# Patient Record
Sex: MALE | Race: OTHER
[De-identification: names, ages, dates, MRNs, and addresses within clinical notes are randomized per-mention and may not be internally consistent; named-entity substitution may affect disease eponyms.]

---

## 2019-10-03 ENCOUNTER — HOSPITAL ENCOUNTER (EMERGENCY)
Dept: HOSPITAL 72 - EMR | Age: 49
Discharge: HOME | End: 2019-10-03
Payer: SELF-PAY

## 2019-10-03 VITALS — DIASTOLIC BLOOD PRESSURE: 74 MMHG | SYSTOLIC BLOOD PRESSURE: 120 MMHG

## 2019-10-03 VITALS — WEIGHT: 183 LBS | HEIGHT: 65 IN | BODY MASS INDEX: 30.49 KG/M2

## 2019-10-03 VITALS — DIASTOLIC BLOOD PRESSURE: 81 MMHG | SYSTOLIC BLOOD PRESSURE: 123 MMHG

## 2019-10-03 VITALS — SYSTOLIC BLOOD PRESSURE: 143 MMHG | DIASTOLIC BLOOD PRESSURE: 89 MMHG

## 2019-10-03 DIAGNOSIS — N32.9: ICD-10-CM

## 2019-10-03 DIAGNOSIS — M54.9: Primary | ICD-10-CM

## 2019-10-03 LAB
ADD MANUAL DIFF: NO
ALBUMIN SERPL-MCNC: 3.7 G/DL (ref 3.4–5)
ALBUMIN/GLOB SERPL: 0.9 {RATIO} (ref 1–2.7)
ALP SERPL-CCNC: 76 U/L (ref 46–116)
ALT SERPL-CCNC: 22 U/L (ref 12–78)
ANION GAP SERPL CALC-SCNC: 8 MMOL/L (ref 5–15)
APPEARANCE UR: CLEAR
APTT BLD: 27 SEC (ref 23–33)
APTT PPP: (no result) S
AST SERPL-CCNC: 25 U/L (ref 15–37)
BASOPHILS NFR BLD AUTO: 0.9 % (ref 0–2)
BILIRUB SERPL-MCNC: 0.6 MG/DL (ref 0.2–1)
BUN SERPL-MCNC: 16 MG/DL (ref 7–18)
CALCIUM SERPL-MCNC: 8.7 MG/DL (ref 8.5–10.1)
CHLORIDE SERPL-SCNC: 105 MMOL/L (ref 98–107)
CO2 SERPL-SCNC: 26 MMOL/L (ref 21–32)
CREAT SERPL-MCNC: 1.2 MG/DL (ref 0.55–1.3)
EOSINOPHIL NFR BLD AUTO: 2.1 % (ref 0–3)
ERYTHROCYTE [DISTWIDTH] IN BLOOD BY AUTOMATED COUNT: 12.1 % (ref 11.6–14.8)
GLOBULIN SER-MCNC: 4.2 G/DL
GLUCOSE UR STRIP-MCNC: NEGATIVE MG/DL
HCT VFR BLD CALC: 45.7 % (ref 42–52)
HGB BLD-MCNC: 15.4 G/DL (ref 14.2–18)
INR PPP: 1 (ref 0.9–1.1)
KETONES UR QL STRIP: NEGATIVE
LEUKOCYTE ESTERASE UR QL STRIP: NEGATIVE
LYMPHOCYTES NFR BLD AUTO: 26 % (ref 20–45)
MCV RBC AUTO: 84 FL (ref 80–99)
MONOCYTES NFR BLD AUTO: 5.1 % (ref 1–10)
NEUTROPHILS NFR BLD AUTO: 65.9 % (ref 45–75)
NITRITE UR QL STRIP: NEGATIVE
PH UR STRIP: 5 [PH] (ref 4.5–8)
PLATELET # BLD: 256 K/UL (ref 150–450)
POTASSIUM SERPL-SCNC: 4.9 MMOL/L (ref 3.5–5.1)
PROT UR QL STRIP: NEGATIVE
RBC # BLD AUTO: 5.45 M/UL (ref 4.7–6.1)
SODIUM SERPL-SCNC: 139 MMOL/L (ref 136–145)
SP GR UR STRIP: 1.02 (ref 1–1.03)
UROBILINOGEN UR-MCNC: NORMAL MG/DL (ref 0–1)
WBC # BLD AUTO: 6.9 K/UL (ref 4.8–10.8)

## 2019-10-03 PROCEDURE — 85025 COMPLETE CBC W/AUTO DIFF WBC: CPT

## 2019-10-03 PROCEDURE — 85610 PROTHROMBIN TIME: CPT

## 2019-10-03 PROCEDURE — 96374 THER/PROPH/DIAG INJ IV PUSH: CPT

## 2019-10-03 PROCEDURE — 85730 THROMBOPLASTIN TIME PARTIAL: CPT

## 2019-10-03 PROCEDURE — 36415 COLL VENOUS BLD VENIPUNCTURE: CPT

## 2019-10-03 PROCEDURE — 74176 CT ABD & PELVIS W/O CONTRAST: CPT

## 2019-10-03 PROCEDURE — 80053 COMPREHEN METABOLIC PANEL: CPT

## 2019-10-03 PROCEDURE — 96375 TX/PRO/DX INJ NEW DRUG ADDON: CPT

## 2019-10-03 PROCEDURE — 99284 EMERGENCY DEPT VISIT MOD MDM: CPT

## 2019-10-03 PROCEDURE — 81003 URINALYSIS AUTO W/O SCOPE: CPT

## 2019-10-03 NOTE — NUR
Discharge Summary: 

Patient sent home after reviewing ct scan by er doctor, report no pain at the 
left flank region. 

he is able to talk with no slur or delay and remains coherent, he answers 
questions accurately, 

IV on the Left AC removed, patient is able to stand with steady gait and walk 
with no impairement, 

uber taken home and arranged by patient.

## 2019-10-03 NOTE — DIAGNOSTIC IMAGING REPORT
Indication: Abdominal pain

 

Technique: Continuous helical transaxial imaging of the abdomen and pelvis was

obtained from the lung bases to the pubic symphysis. No intravenous contrast was

administered. Coronal 2-D reformats were also obtained. Automatic Exposure Control

was utilized.

 

 

Total Dose length Product (DLP):  911 mGycm

 

CT Dose Index Volume (CTDIvol):   15 mGy

 

Comparison: none

 

Findings: There are technical artifacts present limiting evaluation. The lung bases

are clear. There is no hydronephrosis or renal stones identified. There is

questionable ill-definition of the left kidney with minimal perinephric stranding.

The finding, if real may be due to infection or pyelonephritis. The study is limited

in this regard given the nonadministration of IV contrast material. There is mild

thickening of the wall of the urinary bladder. Correlate for cystitis. Partial cecal

resection noted with the staple line. Appendix is absent. The ileocolic anastomosis

appears grossly unremarkable. Bowel gas pattern is nonspecific. There is a tiny supra

umbilical hernia containing fat. There is no free fluid. Gallbladder is unremarkable.

There is a small left inguinal hernia containing fat.

 

IMPRESSION:

 

Questionable, mild ill-definition of the left kidney. Correlate for pyelonephritis.

 

Mild thickening of the bladder wall suggestive of cystitis.

 

No hydronephrosis or evidence of obstructive nephropathy.

 

Limited evaluation due to technical artifacts. Exam also limited by the

nonadministration of IV contrast.

 

Status post partial colonic resection in the right lower quadrant. Ileocolic

anastomosis noted. Appendectomy.

 

 

 

 

The CT scanner at [facility] is accredited by the [employer] College of

[employer] and the scans are performed using dose optimization techniques as

appropriate to a performed exam including Automatic Exposure control.

## 2019-10-03 NOTE — EMERGENCY ROOM REPORT
History of Present Illness


General


Chief Complaint:  Back Pain-No Injury


Source:  Patient





Present Illness


HPI


Patient is a 48-year-old male presents after increased low back pain.  Reports 

of increased pain to the left side of his abdomen.  Radiating to his groin.  He 

denies recent trauma.  Reports having some prior history of kidney stones.  

Denies any fever.  Denies any vomiting.  Pain is worse with movement.  Sharp in 

nature.  Reports having some increased dysuria.  He denies any hematuria.  Pain 

is constant.Patient had not been losing weight.  Denies any history of drug use.


Allergies:  


Coded Allergies:  


     No Known Allergies (Unverified , 10/3/19)





Patient History


Past Medical History:  see triage record


Reviewed Nursing Documentation:  PMH: Agreed; PSxH: Agreed





Nursing Documentation-PMH


Past Medical History:  No History, Except For





Review of Systems


All Other Systems:  negative except mentioned in HPI





Physical Exam





Vital Signs








  Date Time  Temp Pulse Resp B/P (MAP) Pulse Ox O2 Delivery O2 Flow Rate FiO2


 


10/3/19 10:52 98.1 79 19 131/98 (109) 97 Room Air  








Sp02 EP Interpretation:  reviewed, normal


General Appearance:  normal inspection, well appearing, no apparent distress, 

alert, GCS 15


Head:  atraumatic


ENT:  normal ENT inspection, hearing grossly normal, normal voice


Neck:  normal inspection, full range of motion, supple, no bony tend


Respiratory:  normal inspection, lungs clear, normal breath sounds, no 

respiratory distress, no retraction, no wheezing


Cardiovascular #1:  regular rate, rhythm, no edema


Gastrointestinal:  normal inspection, normal bowel sounds, non tender, soft, no 

guarding, no hernia


Genitourinary:  no CVA tenderness


Musculoskeletal:  normal inspection, gait/station normal, decreased range of 

motion


Neurologic:  normal inspection, alert, oriented x3, responsive, CNs III-XII nml 

as tested, speech normal


Psychiatric:  normal inspection, judgement/insight normal, mood/affect normal





Medical Decision Making


Diagnostic Impression:  


 Primary Impression:  


 Bladder wall thickening


 Additional Impression:  


 Back pain


ER Course


Patient presented for back pain.  Differential diagnosis included but was not 

limited to herniated disc, cauda equina syndrome, abdominal aortic aneurysm, 

perforated ulcer, spinal epidural abscess, spinal stenosis, lumbar fracture, 

metastatic lesion, pyelonephritis.   


He does have some prior history of renal stone.  Patient was noted to have a 

history concerning for kidney stone.  CT imaging of the abdomen pelvis was 

ordered due to patient's history.  CT imaging showed no apparent acute 

abnormality.  Patient was given medication for pain with improvement.  He was 

noted to have improvement in pain.


Patient was given prescription for symptomatic treatment.  


Patient was advised to recheck with primary care physician in 1-2 days. 


Patient to return for any worsening, pain, fever, incontinence or other 

concerns.





Labs








Test


  10/3/19


11:05


 


White Blood Count


  6.9 K/UL


(4.8-10.8)


 


Red Blood Count


  5.45 M/UL


(4.70-6.10)


 


Hemoglobin


  15.4 G/DL


(14.2-18.0)


 


Hematocrit


  45.7 %


(42.0-52.0)


 


Mean Corpuscular Volume 84 FL (80-99) 


 


Mean Corpuscular Hemoglobin


  28.2 PG


(27.0-31.0)


 


Mean Corpuscular Hemoglobin


Concent 33.6 G/DL


(32.0-36.0)


 


Red Cell Distribution Width


  12.1 %


(11.6-14.8)


 


Platelet Count


  256 K/UL


(150-450)


 


Mean Platelet Volume


  7.0 FL


(6.5-10.1)


 


Neutrophils (%) (Auto)


  65.9 %


(45.0-75.0)


 


Lymphocytes (%) (Auto)


  26.0 %


(20.0-45.0)


 


Monocytes (%) (Auto)


  5.1 %


(1.0-10.0)


 


Eosinophils (%) (Auto)


  2.1 %


(0.0-3.0)


 


Basophils (%) (Auto)


  0.9 %


(0.0-2.0)


 


Prothrombin Time


  10.8 SEC


(9.30-11.50)


 


Prothromb Time International


Ratio 1.0 (0.9-1.1) 


 


 


Activated Partial


Thromboplast Time 27 SEC (23-33) 


 


 


Urine Color Pale yellow 


 


Urine Appearance Clear 


 


Urine pH 5 (4.5-8.0) 


 


Urine Specific Gravity


  1.020


(1.005-1.035)


 


Urine Protein


  Negative


(NEGATIVE)


 


Urine Glucose (UA)


  Negative


(NEGATIVE)


 


Urine Ketones


  Negative


(NEGATIVE)


 


Urine Blood


  Negative


(NEGATIVE)


 


Urine Nitrite


  Negative


(NEGATIVE)


 


Urine Bilirubin


  Negative


(NEGATIVE)


 


Urine Urobilinogen


  Normal MG/DL


(0.0-1.0)


 


Urine Leukocyte Esterase


  Negative


(NEGATIVE)


 


Sodium Level


  139 MMOL/L


(136-145)


 


Potassium Level


  4.9 MMOL/L


(3.5-5.1)


 


Chloride Level


  105 MMOL/L


()


 


Carbon Dioxide Level


  26 MMOL/L


(21-32)


 


Anion Gap


  8 mmol/L


(5-15)


 


Blood Urea Nitrogen


  16 mg/dL


(7-18)


 


Creatinine


  1.2 MG/DL


(0.55-1.30)


 


Estimat Glomerular Filtration


Rate > 60 mL/min


(>60)


 


Glucose Level


  110 MG/DL


()


 


Calcium Level


  8.7 MG/DL


(8.5-10.1)


 


Total Bilirubin


  0.6 MG/DL


(0.2-1.0)


 


Aspartate Amino Transf


(AST/SGOT) 25 U/L (15-37) 


 


 


Alanine Aminotransferase


(ALT/SGPT) 22 U/L (12-78) 


 


 


Alkaline Phosphatase


  76 U/L


()


 


Total Protein


  7.9 G/DL


(6.4-8.2)


 


Albumin


  3.7 G/DL


(3.4-5.0)


 


Globulin 4.2 g/dL 


 


Albumin/Globulin Ratio 0.9 (1.0-2.7) 











Last Vital Signs








  Date Time  Temp Pulse Resp B/P (MAP) Pulse Ox O2 Delivery O2 Flow Rate FiO2


 


10/3/19 13:45 97.2 62 12 120/74 100 Room Air  








Status:  improved


Disposition:  HOME, SELF-CARE


Condition:  Stable


Departure Forms:  Return to Work   Return to Work in (Days):  2


Patient Instructions:  Back Pain, Adult











Shola Landaverde MD Oct 3, 2019 22:06

## 2019-10-03 NOTE — NUR
Patient complaint of pain on left lower back and rating it at 10/10 with a 
sharp sensation. 

previous diagnosis of renal stones three months ago and prescribed medication 
to clear stones.

he is alert and able to follow commands, he is coherent to name, time, place 
and sitiation, 

able to communicate with no slurr or delay, has a stable gait with and good 
desterity 5/5.

## 2019-10-03 NOTE — NUR
Nursing Note: 

patient taken to CT scan of the abdomen, 

HR is 72, RR of 18 saturating at 98% on room air with no retractions noted, 

BP is 123/81.

## 2019-10-06 ENCOUNTER — HOSPITAL ENCOUNTER (EMERGENCY)
Dept: HOSPITAL 72 - EMR | Age: 49
Discharge: HOME | End: 2019-10-06
Payer: SELF-PAY

## 2019-10-06 VITALS — SYSTOLIC BLOOD PRESSURE: 147 MMHG | DIASTOLIC BLOOD PRESSURE: 73 MMHG

## 2019-10-06 VITALS — BODY MASS INDEX: 30.49 KG/M2 | WEIGHT: 183 LBS | HEIGHT: 65 IN

## 2019-10-06 DIAGNOSIS — M54.5: Primary | ICD-10-CM

## 2019-10-06 DIAGNOSIS — Z90.49: ICD-10-CM

## 2019-10-06 LAB
APPEARANCE UR: CLEAR
APTT PPP: (no result) S
GLUCOSE UR STRIP-MCNC: NEGATIVE MG/DL
KETONES UR QL STRIP: NEGATIVE
LEUKOCYTE ESTERASE UR QL STRIP: NEGATIVE
NITRITE UR QL STRIP: NEGATIVE
PH UR STRIP: 5 [PH] (ref 4.5–8)
PROT UR QL STRIP: NEGATIVE
SP GR UR STRIP: 1.02 (ref 1–1.03)
UROBILINOGEN UR-MCNC: NORMAL MG/DL (ref 0–1)

## 2019-10-06 PROCEDURE — 96372 THER/PROPH/DIAG INJ SC/IM: CPT

## 2019-10-06 PROCEDURE — 81001 URINALYSIS AUTO W/SCOPE: CPT

## 2019-10-06 PROCEDURE — 99283 EMERGENCY DEPT VISIT LOW MDM: CPT

## 2019-10-06 NOTE — EMERGENCY ROOM REPORT
History of Present Illness


General


Chief Complaint:  Lower Back Pain or Injury


Source:  Patient





Present Illness


HPI


48-year-old male complaining of left-sided low back pain x2 days.


Denies fever, weight loss, numbness, weakness, tingling, bowel/bladder 

incontinence.


Pain is 5/10, worse when reaching forward. 


Denies fall or injury.


Normal gait.


Patient states that he was seen here 3 days ago, discharged ith prescription of 

prednisone. 


Had CT scan of the abdomen/ pelvis showing mild thickening of the bladder wall, 

no hydronephrosis.


Allergies:  


Coded Allergies:  


     No Known Allergies (Unverified , 10/3/19)





Patient History


Past Medical History:  see triage record


Past Surgical History:  other - Appendectomy, colonic resection


Reviewed Nursing Documentation:  PMH: Agreed; PSxH: Agreed





Nursing Documentation-PMH


Past Medical History:  No History, Except For





Review of Systems


All Other Systems:  negative except mentioned in HPI





Physical Exam





Vital Signs








  Date Time  Temp Pulse Resp B/P (MAP) Pulse Ox O2 Delivery O2 Flow Rate FiO2


 


10/6/19 14:57 98.2 75 18 147/73 (97) 98 Room Air  








Sp02 EP Interpretation:  reviewed, normal


General Appearance:  no apparent distress, alert, GCS 15, non-toxic


Respiratory:  chest non-tender, lungs clear, normal breath sounds, speaking 

full sentences


Cardiovascular #1:  regular rate, rhythm, no edema


Musculoskeletal:  other - lumbar region: no deformity, no swelling, no spinal 

tenderness, mild tenderness to left paravertebral muscles.


Neurologic:  alert, oriented x3, responsive, motor strength/tone normal, 

sensory intact, speech normal


Skin:  no rash, warm/dry





Medical Decision Making


PA Attestation


This patient was seen under the direct supervision of Dr. Blanco, who directed 

all aspects of care and diagnostic interpretation.


Diagnostic Impression:  


 Primary Impression:  


 Back pain


ER Course


ED course


HPI: 48-year-old male complaining of left-sided low back pain x2 days.


Denies fever, weight loss, numbness, weakness, tingling, bowel/bladder 

incontinence.


Denies fall or injury.


Pain is 5/10, worse when reaching forward. 


Normal gait.


Patient states that he was seen here 3 days ago, discharged ith prescription of 

prednisone. 


Had CT scan of the abdomen/ pelvis showing mild thickening of the bladder wall, 

no hydronephrosis.





Ddx: UTI, muscle strain, kidney stone





HPI & PE consistent with: Back pain





Orders/ Interventions:


Pain worse with movement. Suspect symptoms likely MSK. 


UA normal.  Patient medicated with Toradol 30 mg IM with improvement of 

symptoms.





Disposition:


Patient discharged with prescription for meloxicam. Advised trial of moist heat 

(warm compress) to back, use for 15 minutes, 4 times a day, continue normal 

physical activities as tolerated. Advised light stretches as tolerated. Avoid 

heavy lifting and strenuous exercise until better. Advised to avoid prolonged 

laying down, sitting, or standing, get up every so often to stretch


At this time pt. is stable for d/c to home. Will provide printed patient care 

instructions, and any necessary prescriptions. Care plan and follow up 

instructions have been discussed with the patient prior to discharge.





Please note that this Emergency Department Report was dictated using [employer] technology software, occasionally this can lead to 

erroneous entry secondary to interpretation by the dictation equipment.





Laboratory Tests








Test


  10/6/19


15:40


 


Urine Color Pale yellow  


 


Urine Appearance Clear  


 


Urine pH 5 (4.5-8.0)  


 


Urine Specific Gravity


  1.025


(1.005-1.035)


 


Urine Protein


  Negative


(NEGATIVE)


 


Urine Glucose (UA)


  Negative


(NEGATIVE)


 


Urine Ketones


  Negative


(NEGATIVE)


 


Urine Blood


  Negative


(NEGATIVE)


 


Urine Nitrite


  Negative


(NEGATIVE)


 


Urine Bilirubin


  Negative


(NEGATIVE)


 


Urine Urobilinogen


  Normal MG/DL


(0.0-1.0)


 


Urine Leukocyte Esterase


  Negative


(NEGATIVE)


 


Urine RBC


  0-2 /HPF (0 -


0)  H


 


Urine WBC


  0-2 /HPF (0 -


0)


 


Urine Squamous Epithelial


Cells Occasional


/LPF


 


Urine Bacteria


  Occasional


/HPF (NONE)











Last Vital Signs








  Date Time  Temp Pulse Resp B/P (MAP) Pulse Ox O2 Delivery O2 Flow Rate FiO2


 


10/6/19 16:44 98.0 70 16 133/70 100 Room Air  








Disposition:  HOME, SELF-CARE


Condition:  Improved


Scripts


Meloxicam* (MELOXICAM*) 15 Mg Tablet


15 MG PO DAILY, #10 TAB


   Prov: Troy Moran         10/6/19


Referrals:  


NOT CHOSEN IPA/MD,REFERRING (PCP)


Patient Instructions:  Lumbosacral Strain, Back Pain, Adult





Additional Instructions:  


Follow-up with PCP in 2 days or return to ER if worsening symptoms, new 

symptoms or sudden change in condition.











Troy Moran Oct 6, 2019 17:58

## 2019-10-06 NOTE — NUR
ED Nurse Note:

pt walked in to ED due to worsening left lower back pain for 3 days.  pt had 
kidney stone couple months ago and prescribed medication.  came to [facility] 3 
days ago for smiliar sx.  instructed to come back if sx gets worse.  denies any 
difficulty or painful urination.  per pt, feel pain when he strech out the arm. 
 without movement, no pain, just discomfort.  AAO x4.  respirations even and 
non-labored noted.  skin warm to touch.  no open wound noted.  will wait for 
the further order.